# Patient Record
Sex: FEMALE | Race: OTHER
[De-identification: names, ages, dates, MRNs, and addresses within clinical notes are randomized per-mention and may not be internally consistent; named-entity substitution may affect disease eponyms.]

---

## 2019-10-18 ENCOUNTER — HOSPITAL ENCOUNTER (EMERGENCY)
Dept: HOSPITAL 25 - ED | Age: 19
LOS: 1 days | Discharge: HOME | End: 2019-10-19
Payer: COMMERCIAL

## 2019-10-18 DIAGNOSIS — R07.89: ICD-10-CM

## 2019-10-18 DIAGNOSIS — Z32.02: ICD-10-CM

## 2019-10-18 DIAGNOSIS — J40: Primary | ICD-10-CM

## 2019-10-18 DIAGNOSIS — M79.89: ICD-10-CM

## 2019-10-18 DIAGNOSIS — R11.0: ICD-10-CM

## 2019-10-18 DIAGNOSIS — J02.9: ICD-10-CM

## 2019-10-18 LAB
ALBUMIN SERPL BCG-MCNC: 4.5 G/DL (ref 3.2–5.2)
ALBUMIN/GLOB SERPL: 1.3 {RATIO} (ref 1–3)
ALP SERPL-CCNC: 66 U/L (ref 34–104)
ALT SERPL W P-5'-P-CCNC: 8 U/L (ref 7–52)
ANION GAP SERPL CALC-SCNC: 9 MMOL/L (ref 2–11)
AST SERPL-CCNC: 17 U/L (ref 13–39)
BASOPHILS # BLD AUTO: 0.1 10^3/UL (ref 0–0.2)
BUN SERPL-MCNC: 11 MG/DL (ref 6–24)
BUN/CREAT SERPL: 13.4 (ref 8–20)
CALCIUM SERPL-MCNC: 10 MG/DL (ref 8.6–10.3)
CHLORIDE SERPL-SCNC: 104 MMOL/L (ref 101–111)
EOSINOPHIL # BLD AUTO: 0.1 10^3/UL (ref 0–0.6)
GLOBULIN SER CALC-MCNC: 3.5 G/DL (ref 2–4)
GLUCOSE SERPL-MCNC: 93 MG/DL (ref 70–100)
HCG SERPL QL: < 0.6 MIU/ML
HCO3 SERPL-SCNC: 25 MMOL/L (ref 22–32)
HCT VFR BLD AUTO: 42 % (ref 35–47)
HGB BLD-MCNC: 13.9 G/DL (ref 12–16)
LYMPHOCYTES # BLD AUTO: 2.4 10^3/UL (ref 1–4.8)
MCH RBC QN AUTO: 30 PG (ref 27–31)
MCHC RBC AUTO-ENTMCNC: 33 G/DL (ref 31–36)
MCV RBC AUTO: 89 FL (ref 80–97)
MONOCYTES # BLD AUTO: 0.9 10^3/UL (ref 0–0.8)
NEUTROPHILS # BLD AUTO: 11.9 10^3/UL (ref 1.5–7.7)
NRBC # BLD AUTO: 0 10^3/UL
NRBC BLD QL AUTO: 0.1
PLATELET # BLD AUTO: 351 10^3/UL (ref 150–450)
POTASSIUM SERPL-SCNC: 4 MMOL/L (ref 3.5–5)
PROT SERPL-MCNC: 8 G/DL (ref 6.4–8.9)
RBC # BLD AUTO: 4.66 10^6 /UL (ref 3.7–4.87)
SODIUM SERPL-SCNC: 138 MMOL/L (ref 135–145)
WBC # BLD AUTO: 15.4 10^3/UL (ref 3.5–10.8)

## 2019-10-18 PROCEDURE — 96374 THER/PROPH/DIAG INJ IV PUSH: CPT

## 2019-10-18 PROCEDURE — 80053 COMPREHEN METABOLIC PANEL: CPT

## 2019-10-18 PROCEDURE — 96361 HYDRATE IV INFUSION ADD-ON: CPT

## 2019-10-18 PROCEDURE — 84702 CHORIONIC GONADOTROPIN TEST: CPT

## 2019-10-18 PROCEDURE — 99283 EMERGENCY DEPT VISIT LOW MDM: CPT

## 2019-10-18 PROCEDURE — 71046 X-RAY EXAM CHEST 2 VIEWS: CPT

## 2019-10-18 PROCEDURE — 93005 ELECTROCARDIOGRAM TRACING: CPT

## 2019-10-18 PROCEDURE — 36415 COLL VENOUS BLD VENIPUNCTURE: CPT

## 2019-10-18 PROCEDURE — 83690 ASSAY OF LIPASE: CPT

## 2019-10-18 PROCEDURE — 85379 FIBRIN DEGRADATION QUANT: CPT

## 2019-10-18 PROCEDURE — 85025 COMPLETE CBC W/AUTO DIFF WBC: CPT

## 2019-10-18 NOTE — ED
Abdominal Pain/Female





- HPI Summary


HPI Summary: 





This pt is a 20 Y/O F presenting to [facility] for a CC of abdominal pain located in 

her epigastric region that radiates into her stomach and rated a 4/10 in 

severity. She states that she has had flu like symptoms a couple weeks ago and 

that her symptoms had worsened today. She states that she has a sore throat, 

chills, productive cough with yellow thick phlegm, SOB that started today, and 

nausea that also started today. She also states that she has been fatigued and 

has had swelling in her R lower extremity behind her knee. She states that her 

pain was really bad today and was the worst that it has been. She states that 

the pain is worse on the L side of her epigastric region. She has no 

aggravating or alleviating factors. She states no PMHx that is pertinent. 





- History of Current Complaint


Chief Complaint: EDAbdPain


Stated Complaint: CHEST PAIN PER PT


Time Seen by Provider: 10/18/19 21:59


Hx Obtained From: Patient


Onset/Duration: Sudden Onset, Lasting Weeks, Still Present, Worse Since - this 

morning


Timing: Constant


Severity Initially: Mild


Severity Currently: Moderate


Pain Intensity: 5


Pain Scale Used: 0-10 Numeric


Location: Epigastric - states that it is worse on the L side


Radiates: Yes


Radiates to: Other - stomach


Aggravating Factor(s): Nothing


Alleviating Factor(s): Nothing


Associated Signs and Symptoms: Positive: Cough - production of yellow thick 

mucus, Nausea, Other: - POSITIVE: R lower extemity swelling, sore throat, chills

, productive cough with yellow thick phlegm, SOB that started today, and nausea 

that also started today.


Allergies/Adverse Reactions: 


 Allergies











Allergy/AdvReac Type Severity Reaction Status Date / Time


 


No Known Allergies Allergy   Verified 10/18/19 18:31














PMH/Surg Hx/FS Hx/Imm Hx


Previously Healthy: Yes


Infectious Disease History: No


Infectious Disease History: 


   Denies: Traveled Outside the [place] in Last 30 Days





- Social History


Occupation: Student - [employer] 


Lives: Dormitory/Roommates





Review of Systems


Positive: Chills, Fatigue.  Negative: Fever


Positive: Sore Throat


Positive: Shortness Of Breath, Cough - productive with yellow, thick, phlegm 


Positive: Abdominal Pain - epigastric, radiates to the stomach , Nausea


Musculoskeletal: Other - POSITIVE: swelling her R lower extremity 


All Other Systems Reviewed And Are Negative: Yes





Physical Exam





- Summary


Physical Exam Summary: 





Appearance: Well-appearing, Well-nourished, lying in bed comfortably


Skin: Warm, dry, no obvious rash


Eyes: sclera anicteric, no conjunctival pallor


ENT: mucous membranes moist, pharynx appears normal


Neck: Supple, nontender


Respiratory: Clear to auscultation, no signs of respiratory distress


Cardiovascular: Normal S1, S2. No murmurs. Normal distal pulses in tibial and 

radial bilaterally.


Abdomen: Soft, nontender, normal active bowel sounds present


Musculoskeletal: Normal, Strength/ROM Intact


Neurological: A&Ox3, awake and alert, mentation is normal, speech is fluent and 

appropriate


Psychiatric: affect is normal, does not appear anxious or depressed





Triage Information Reviewed: Yes


Vital Signs On Initial Exam: 


 Initial Vitals











Temp Pulse Resp BP Pulse Ox


 


 99.6 F   89   18   161/110   98 


 


 10/18/19 18:27  10/18/19 18:27  10/18/19 18:27  10/18/19 18:27  10/18/19 18:27











Vital Signs Reviewed: Yes





Procedures





- Sedation


Patient Received Moderate/Deep Sedation with Procedure: No





Diagnostics





- Vital Signs


 Vital Signs











  Temp Pulse Resp BP Pulse Ox


 


 10/18/19 20:30  99.5 F  92  18  136/76  97


 


 10/18/19 18:27  99.6 F  89  18  161/110  98














- Laboratory


Lab Results: 


 Lab Results











  10/18/19 10/18/19 Range/Units





  19:34 19:34 


 


WBC  15.4 H   (3.5-10.8)  10^3/uL


 


RBC  4.66   (3.70-4.87)  10^6 /uL


 


Hgb  13.9   (12.0-16.0)  g/dL


 


Hct  42   (35-47)  %


 


MCV  89   (80-97)  fL


 


MCH  30   (27-31)  pg


 


MCHC  33   (31-36)  g/dL


 


RDW  14   (10-15)  %


 


Plt Count  351   (150-450)  10^3/uL


 


MPV  8.3   (7.4-10.4)  fL


 


Neut % (Auto)  77.3   %


 


Lymph % (Auto)  15.4   %


 


Mono % (Auto)  6.1   %


 


Eos % (Auto)  0.4   %


 


Baso % (Auto)  0.8   %


 


Absolute Neuts (auto)  11.9 H   (1.5-7.7)  10^3/ul


 


Absolute Lymphs (auto)  2.4   (1.0-4.8)  10^3/ul


 


Absolute Monos (auto)  0.9 H   (0-0.8)  10^3/ul


 


Absolute Eos (auto)  0.1   (0-0.6)  10^3/ul


 


Absolute Basos (auto)  0.1   (0-0.2)  10^3/ul


 


Absolute Nucleated RBC  0.0   10^3/ul


 


Nucleated RBC %  0.1   


 


Sodium   138  (135-145)  mmol/L


 


Potassium   4.0  (3.5-5.0)  mmol/L


 


Chloride   104  (101-111)  mmol/L


 


Carbon Dioxide   25  (22-32)  mmol/L


 


Anion Gap   9  (2-11)  mmol/L


 


BUN   11  (6-24)  mg/dL


 


Creatinine   0.82  (0.51-0.95)  mg/dL


 


Est GFR ( Amer)   106.5  (>60)  


 


Est GFR (Non-Af Amer)   88.0  (>60)  


 


BUN/Creatinine Ratio   13.4  (8-20)  


 


Glucose   93  ()  mg/dL


 


Calcium   10.0  (8.6-10.3)  mg/dL


 


Total Bilirubin   0.40  (0.2-1.0)  mg/dL


 


AST   17  (13-39)  U/L


 


ALT   8  (7-52)  U/L


 


Alkaline Phosphatase   66  ()  U/L


 


Total Protein   8.0  (6.4-8.9)  g/dL


 


Albumin   4.5  (3.2-5.2)  g/dL


 


Globulin   3.5  (2-4)  g/dL


 


Albumin/Globulin Ratio   1.3  (1-3)  


 


Lipase   25  (11.0-82.0)  U/L


 


Beta HCG, Quant   < 0.60  mIU/mL











Result Diagrams: 


 10/18/19 19:34





 10/18/19 19:34


Lab Statement: Any lab studies that have been ordered have been reviewed, and 

results considered in the medical decision making process.





- Radiology


  ** CXR


Radiology Interpretation Completed By: ED Physician


Summary of Radiographic Findings: No acute processes. Pending offical review.





- Ultrasound


  ** Venous Doppler Study


Ultrasound Interpretation Completed By: Radiologist


Summary of Ultrasound Findings: No evidence for a DVT. ED physician has 

reviewed this report.





- EKG


  ** 1819


Cardiac Rate: NL - 88 BPM


EKG Rhythm: Sinus Rhythm


ST Segment: Normal


Ectopy: None


Summary of EKG Findings: NSR at 88 BPM, P waves, QRS complex, and T waves are 

within normal limits, T waves and intervals are normal, no ischemic changes. 

This is a normal EKG. Interpreted by Dr. Scott at 10/18/19 1825.





Abdominal Pain Fem Course/Dx





- Course


Course Of Treatment: This pt is a 20 Y/O F presenting to [facility] for a CC of 

abdominal pain located in her epigastric region that radiates into her stomach 

and rated a 4/10 in severity. She states that she has had flu like symptoms a 

couple weeks ago and that her symptoms had worsened today. She states that she 

has a sore throat, chills, productive cough with yellow thick phlegm, SOB that 

started today, and nausea that also started today. She states that her pain was 

really bad today and was the worst that it has been.  Her PE found no abnormal 

findings.  She has abnormal laboratory values in her WBc, Absolute neuts, 

Absolute Monos.  Her EKG taken at 1819 found that she had a NSR at 88 BPM, P 

waves, QRS complex, and T waves are within normal limits, T waves and intervals 

are normal, no ischemic changes. This is a normal EKG.  Her CXR shows no acute 

processes.  Her Venous Doppler Study found no evidence for a DVT.  She will be 

discharged home with a Dx of bronchitis and chest wall pain.





- Diagnoses


Provider Diagnoses: 


 Bronchitis, Chest wall pain








Discharge ED





- Sign-Out/Discharge


Documenting (check all that apply): Patient Departure - discharge 





- Discharge Plan


Condition: Good


Disposition: HOME


Prescriptions: 


Azithromycin TAB* [Zithromax TAB (Z-TRAV) 250 mg #6 tabs] 2 tab PO .TODAY, THEN 

1 DAILY #1 trav


Patient Education Materials:  Acute Bronchitis (ED)


Referrals: 


[facility] [Primary Care Provider] - 


Additional Instructions: 


You can take alleve (naproxen) 1 tab twice daily for pain.  the 

antibiotic in the morning from [facility] and start that.





- Billing Disposition and Condition


Condition: GOOD


Disposition: Home





- Attestation Statements


Document Initiated by Scribe: Yes


Documenting Scribe: Archie Pearl


Provider For Whom Scribe is Documenting (Include Credential): Srikanth Soto MD 


Scribe Attestation: 


I, Archie Pearl, scribed for Srikanth Soto MD  on 10/19/19 at 2113. 


Scribe Documentation Reviewed: Yes


Provider Attestation: 


The documentation as recorded by the scribeArchie accurately reflects 

the service I personally performed and the decisions made by me, Srikanth Soto MD 


Status of Scribe Document: Viewed

## 2019-10-19 VITALS — SYSTOLIC BLOOD PRESSURE: 142 MMHG | DIASTOLIC BLOOD PRESSURE: 94 MMHG

## 2021-11-26 ENCOUNTER — EMERGENCY (EMERGENCY)
Facility: HOSPITAL | Age: 21
LOS: 1 days | Discharge: HOME | End: 2021-11-26
Attending: EMERGENCY MEDICINE | Admitting: EMERGENCY MEDICINE
Payer: COMMERCIAL

## 2021-11-26 VITALS
RESPIRATION RATE: 18 BRPM | WEIGHT: 124.78 LBS | OXYGEN SATURATION: 94 % | SYSTOLIC BLOOD PRESSURE: 109 MMHG | TEMPERATURE: 99 F | DIASTOLIC BLOOD PRESSURE: 62 MMHG | HEIGHT: 64 IN | HEART RATE: 106 BPM

## 2021-11-26 VITALS
DIASTOLIC BLOOD PRESSURE: 65 MMHG | SYSTOLIC BLOOD PRESSURE: 100 MMHG | RESPIRATION RATE: 18 BRPM | OXYGEN SATURATION: 99 % | TEMPERATURE: 99 F | HEART RATE: 78 BPM

## 2021-11-26 DIAGNOSIS — R50.9 FEVER, UNSPECIFIED: ICD-10-CM

## 2021-11-26 DIAGNOSIS — Z20.822 CONTACT WITH AND (SUSPECTED) EXPOSURE TO COVID-19: ICD-10-CM

## 2021-11-26 LAB
ANION GAP SERPL CALC-SCNC: 10 MMOL/L — SIGNIFICANT CHANGE UP (ref 5–17)
APPEARANCE UR: CLEAR — SIGNIFICANT CHANGE UP
BACTERIA # UR AUTO: PRESENT /HPF
BASOPHILS # BLD AUTO: 0.1 K/UL — SIGNIFICANT CHANGE UP (ref 0–0.2)
BASOPHILS NFR BLD AUTO: 1.7 % — SIGNIFICANT CHANGE UP (ref 0–2)
BILIRUB UR-MCNC: NEGATIVE — SIGNIFICANT CHANGE UP
BUN SERPL-MCNC: 14 MG/DL — SIGNIFICANT CHANGE UP (ref 7–23)
CALCIUM SERPL-MCNC: 9.4 MG/DL — SIGNIFICANT CHANGE UP (ref 8.4–10.5)
CHLORIDE SERPL-SCNC: 104 MMOL/L — SIGNIFICANT CHANGE UP (ref 96–108)
CO2 SERPL-SCNC: 22 MMOL/L — SIGNIFICANT CHANGE UP (ref 22–31)
COLOR SPEC: YELLOW — SIGNIFICANT CHANGE UP
CREAT SERPL-MCNC: 0.9 MG/DL — SIGNIFICANT CHANGE UP (ref 0.5–1.3)
DIFF PNL FLD: NEGATIVE — SIGNIFICANT CHANGE UP
EOSINOPHIL # BLD AUTO: 0 K/UL — SIGNIFICANT CHANGE UP (ref 0–0.5)
EOSINOPHIL NFR BLD AUTO: 0 % — SIGNIFICANT CHANGE UP (ref 0–6)
EPI CELLS # UR: ABNORMAL /HPF (ref 0–5)
GIANT PLATELETS BLD QL SMEAR: PRESENT — SIGNIFICANT CHANGE UP
GLUCOSE SERPL-MCNC: 106 MG/DL — HIGH (ref 70–99)
GLUCOSE UR QL: NEGATIVE — SIGNIFICANT CHANGE UP
HCT VFR BLD CALC: 35.4 % — SIGNIFICANT CHANGE UP (ref 34.5–45)
HGB BLD-MCNC: 12.1 G/DL — SIGNIFICANT CHANGE UP (ref 11.5–15.5)
KETONES UR-MCNC: 15 MG/DL
LACTATE SERPL-SCNC: 0.9 MMOL/L — SIGNIFICANT CHANGE UP (ref 0.5–2)
LEUKOCYTE ESTERASE UR-ACNC: NEGATIVE — SIGNIFICANT CHANGE UP
LYMPHOCYTES # BLD AUTO: 0.2 K/UL — LOW (ref 1–3.3)
LYMPHOCYTES # BLD AUTO: 3.5 % — LOW (ref 13–44)
MANUAL SMEAR VERIFICATION: SIGNIFICANT CHANGE UP
MCHC RBC-ENTMCNC: 31.6 PG — SIGNIFICANT CHANGE UP (ref 27–34)
MCHC RBC-ENTMCNC: 34.2 GM/DL — SIGNIFICANT CHANGE UP (ref 32–36)
MCV RBC AUTO: 92.4 FL — SIGNIFICANT CHANGE UP (ref 80–100)
MONOCYTES # BLD AUTO: 0.35 K/UL — SIGNIFICANT CHANGE UP (ref 0–0.9)
MONOCYTES NFR BLD AUTO: 6.1 % — SIGNIFICANT CHANGE UP (ref 2–14)
NEUTROPHILS # BLD AUTO: 5.05 K/UL — SIGNIFICANT CHANGE UP (ref 1.8–7.4)
NEUTROPHILS NFR BLD AUTO: 87.8 % — HIGH (ref 43–77)
NEUTS BAND # BLD: 0.9 % — SIGNIFICANT CHANGE UP (ref 0–8)
NITRITE UR-MCNC: NEGATIVE — SIGNIFICANT CHANGE UP
OVALOCYTES BLD QL SMEAR: SLIGHT — SIGNIFICANT CHANGE UP
PH UR: 6.5 — SIGNIFICANT CHANGE UP (ref 5–8)
PLAT MORPH BLD: ABNORMAL
PLATELET # BLD AUTO: 295 K/UL — SIGNIFICANT CHANGE UP (ref 150–400)
POIKILOCYTOSIS BLD QL AUTO: SLIGHT — SIGNIFICANT CHANGE UP
POTASSIUM SERPL-MCNC: 3.9 MMOL/L — SIGNIFICANT CHANGE UP (ref 3.5–5.3)
POTASSIUM SERPL-SCNC: 3.9 MMOL/L — SIGNIFICANT CHANGE UP (ref 3.5–5.3)
PROT UR-MCNC: 30 MG/DL
RAPID RVP RESULT: SIGNIFICANT CHANGE UP
RBC # BLD: 3.83 M/UL — SIGNIFICANT CHANGE UP (ref 3.8–5.2)
RBC # FLD: 11.9 % — SIGNIFICANT CHANGE UP (ref 10.3–14.5)
RBC BLD AUTO: ABNORMAL
RBC CASTS # UR COMP ASSIST: < 5 /HPF — SIGNIFICANT CHANGE UP
SARS-COV-2 RNA SPEC QL NAA+PROBE: SIGNIFICANT CHANGE UP
SODIUM SERPL-SCNC: 136 MMOL/L — SIGNIFICANT CHANGE UP (ref 135–145)
SP GR SPEC: 1.02 — SIGNIFICANT CHANGE UP (ref 1–1.03)
UROBILINOGEN FLD QL: 0.2 E.U./DL — SIGNIFICANT CHANGE UP
WBC # BLD: 5.69 K/UL — SIGNIFICANT CHANGE UP (ref 3.8–10.5)
WBC # FLD AUTO: 5.69 K/UL — SIGNIFICANT CHANGE UP (ref 3.8–10.5)
WBC UR QL: < 5 /HPF — SIGNIFICANT CHANGE UP

## 2021-11-26 PROCEDURE — 81001 URINALYSIS AUTO W/SCOPE: CPT

## 2021-11-26 PROCEDURE — 36415 COLL VENOUS BLD VENIPUNCTURE: CPT

## 2021-11-26 PROCEDURE — 87040 BLOOD CULTURE FOR BACTERIA: CPT

## 2021-11-26 PROCEDURE — 99284 EMERGENCY DEPT VISIT MOD MDM: CPT

## 2021-11-26 PROCEDURE — 99283 EMERGENCY DEPT VISIT LOW MDM: CPT

## 2021-11-26 PROCEDURE — 87184 SC STD DISK METHOD PER PLATE: CPT

## 2021-11-26 PROCEDURE — 80048 BASIC METABOLIC PNL TOTAL CA: CPT

## 2021-11-26 PROCEDURE — 83605 ASSAY OF LACTIC ACID: CPT

## 2021-11-26 PROCEDURE — 85025 COMPLETE CBC W/AUTO DIFF WBC: CPT

## 2021-11-26 PROCEDURE — 0225U NFCT DS DNA&RNA 21 SARSCOV2: CPT

## 2021-11-26 PROCEDURE — 87086 URINE CULTURE/COLONY COUNT: CPT

## 2021-11-26 RX ORDER — IBUPROFEN 200 MG
600 TABLET ORAL ONCE
Refills: 0 | Status: COMPLETED | OUTPATIENT
Start: 2021-11-26 | End: 2021-11-26

## 2021-11-26 RX ORDER — SODIUM CHLORIDE 9 MG/ML
1000 INJECTION INTRAMUSCULAR; INTRAVENOUS; SUBCUTANEOUS ONCE
Refills: 0 | Status: COMPLETED | OUTPATIENT
Start: 2021-11-26 | End: 2021-11-26

## 2021-11-26 RX ADMIN — Medication 600 MILLIGRAM(S): at 09:51

## 2021-11-26 RX ADMIN — SODIUM CHLORIDE 1000 MILLILITER(S): 9 INJECTION INTRAMUSCULAR; INTRAVENOUS; SUBCUTANEOUS at 09:51

## 2021-11-26 RX ADMIN — SODIUM CHLORIDE 1000 MILLILITER(S): 9 INJECTION INTRAMUSCULAR; INTRAVENOUS; SUBCUTANEOUS at 11:27

## 2021-11-26 RX ADMIN — Medication 600 MILLIGRAM(S): at 11:26

## 2021-11-26 NOTE — ED PROVIDER NOTE - CLINICAL SUMMARY MEDICAL DECISION MAKING FREE TEXT BOX
fever, chills and bodyaches < 24 hrs. pt well appearing. low grade temp in ED. motrin and fluid given. labs noted. no elevated wbc, no infection on ua. will tx with motrin, tylenol, increase fluids and f/u with pmd. return precuations d/w pt. rvp results pending. self isolation until rvp results.

## 2021-11-26 NOTE — ED PROVIDER NOTE - NSFOLLOWUPINSTRUCTIONS_ED_ALL_ED_FT
Follow up with your physician within one week.       Viral Syndrome    WHAT YOU NEED TO KNOW:    Viral syndrome is a term used for symptoms of an infection caused by a virus. Viruses are spread easily from person to person through the air and on shared items.    DISCHARGE INSTRUCTIONS:    Call your local emergency number (911 in the [place]) or have someone else call if:   •You have a seizure.      •You cannot be woken.      •You have chest pain or trouble breathing.      Return to the emergency department if:   •You have a stiff neck, a bad headache, and sensitivity to light.      •You feel weak, dizzy, or confused.      •You stop urinating or urinate a lot less than usual.      •You cough up blood or thick yellow or green mucus.      •You have severe abdominal pain or your abdomen is larger than usual.      Call your doctor if:   •Your symptoms do not get better with treatment or get worse after 3 days.      •You have a rash or ear pain.      •You have burning when you urinate.      •You have questions or concerns about your condition or care.      Medicines: You may need any of the following:   •Acetaminophen decreases pain and fever. It is available without a doctor's order. Ask how much to take and how often to take it. Follow directions. Read the labels of all other medicines you are using to see if they also contain acetaminophen, or ask your doctor or pharmacist. Acetaminophen can cause liver damage if not taken correctly. Do not use more than 4 grams (4,000 milligrams) total of acetaminophen in one day.       •NSAIDs, such as ibuprofen, help decrease swelling, pain, and fever. NSAIDs can cause stomach bleeding or kidney problems in certain people. If you take blood thinner medicine, always ask your healthcare provider if NSAIDs are safe for you. Always read the medicine label and follow directions.      •Cold medicine helps decrease swelling, control a cough, and relieve chest or nasal congestion.       •Saline nasal spray helps decrease nasal congestion.       •Take your medicine as directed. Contact your healthcare provider if you think your medicine is not helping or if you have side effects. Tell him of her if you are allergic to any medicine. Keep a list of the medicines, vitamins, and herbs you take. Include the amounts, and when and why you take them. Bring the list or the pill bottles to follow-up visits. Carry your medicine list with you in case of an emergency.      Manage your symptoms:   •Drink liquids as directed to prevent dehydration. Ask how much liquid to drink each day and which liquids are best for you. Ask if you should drink an oral rehydration solution (ORS). An ORS has the right amounts of water, salts, and sugar you need to replace body fluids. This may help prevent dehydration caused by vomiting or diarrhea. Do not drink liquids with caffeine. Liquids with caffeine can make dehydration worse.      •Get plenty of rest to help your body heal. Take naps throughout the day. Ask your healthcare provider when you can return to work and your normal activities.      •Use a cool mist humidifier to help you breathe easier. Ask your healthcare provider how to use a cool mist humidifier.      •Eat honey or use cough drops for a sore throat. Cough drops are available without a doctor's order. Follow directions for taking cough drops.      •Do not smoke or be close to anyone who is smoking. Nicotine and other chemicals in cigarettes and cigars can cause lung damage. Smoking can also delay healing. Ask your healthcare provider for information if you currently smoke and need help to quit. E-cigarettes or smokeless tobacco still contain nicotine. Talk to your healthcare provider before you use these products.      Prevent the spread of germs:          •Wash your hands often. Wash your hands several times each day. Wash after you use the bathroom, change a child's diaper, and before you prepare or eat food. Use soap and water every time. Rub your soapy hands together, lacing your fingers. Wash the front and back of your hands, and in between your fingers. Use the fingers of one hand to scrub under the fingernails of the other hand. Wash for at least 20 seconds. Rinse with warm, running water for several seconds. Then dry your hands with a clean towel or paper towel. Use hand  that contains alcohol if soap and water are not available. Do not touch your eyes, nose, or mouth without washing your hands first.  Handwashing           •Cover a sneeze or cough. Use a tissue that covers your mouth and nose. Throw the tissue away in a trash can right away. Use the bend of your arm if a tissue is not available. Wash your hands well with soap and water or use a hand .      •Stay away from others while you are sick. Avoid crowds as much as possible.      •Ask about vaccines you may need. Talk to your healthcare provider about your vaccine history. He or she will tell you which vaccines you need, and when to get them.?Get the influenza (flu) vaccine as soon as recommended each year. The flu vaccine is available starting in September or October. Flu viruses change, so it is important to get a flu vaccine every year.      ?Get the pneumonia vaccine if recommended. This vaccine is usually recommended every 5 years. Your provider will tell you when to get this vaccine, if needed.        Follow up with your doctor as directed: Write down your questions so you remember to ask them during your visits.       © Copyright [employer] 2021           back to top                          © Copyright [employer] 2021

## 2021-11-26 NOTE — ED ADULT TRIAGE NOTE - CHIEF COMPLAINT QUOTE
Pt complains of persistent fever since last night. Took two Tylenol extra strength last night and this morning. Fever associated with body aches and muscle pain.

## 2021-11-26 NOTE — ED ADULT NURSE NOTE - OBJECTIVE STATEMENT
21 y.o F a&ox4 walked in from triage c.o fever. fever and body aches since yesterday. reports 102F this morning took Tylenol at 7:30 am.  went to urgent care tested neg for strep and flu. sent in for elevated fever. denies SOB, CP, diarrhea, sore throat, cough, loss of taste/ smell.

## 2021-11-26 NOTE — ED PROVIDER NOTE - PATIENT PORTAL LINK FT
You can access the FollowMyHealth Patient Portal offered by [facility] by registering at the following website: [email or web address]. By joining "[employer]’s FollowMyHealth portal, you will also be able to view your health information using other applications (apps) compatible with our system.

## 2021-11-26 NOTE — ED PROVIDER NOTE - ATTENDING CONTRIBUTION TO CARE
20 yo female no pmh sent from urgent care for fever, myalgias.  T max 101.  No uri sx, cough, cp, sob, abd pain, n/v/d, dysuria, freq, urgency, flank pain, skin lesions/rash, sick contacts. + travel to [place].  S/p covid vaccination.  Rapid flu and covid neg at urgent care.  Well appearing, nad, nc/at, lung cta, heart reg, abd soft, nt, ext no gross deformity, no gross neuro deficits.  Patient likely has a viral infection; no sx to suggest meningitis, pna, uti, intra-abd infection.  The patient is non toxic appearing with no focal lung findings and acceptable oxygenation.  No increased wob or resp distress.  No further eval or treatment indicated at this time.  Supportive care including increased fluids and over the counter therapy was advised and discussed.  Plan labs, reassess. Pt counseled to quarantine x 10 d unless COVID neg.  Typical viral illness and COVID course discussed; pt counseled to return for worsening ha, neck stiffness, cp, sob, fever, any other concerns.  Hand hygiene and isolation reviewed.

## 2021-11-28 LAB
-  CLINDAMYCIN: SIGNIFICANT CHANGE UP
-  ERYTHROMYCIN: SIGNIFICANT CHANGE UP
-  LEVOFLOXACIN: SIGNIFICANT CHANGE UP
-  PENICILLIN: SIGNIFICANT CHANGE UP
-  VANCOMYCIN: SIGNIFICANT CHANGE UP
CULTURE RESULTS: SIGNIFICANT CHANGE UP
METHOD TYPE: SIGNIFICANT CHANGE UP
ORGANISM # SPEC MICROSCOPIC CNT: SIGNIFICANT CHANGE UP
ORGANISM # SPEC MICROSCOPIC CNT: SIGNIFICANT CHANGE UP
SPECIMEN SOURCE: SIGNIFICANT CHANGE UP

## 2021-12-01 LAB
CULTURE RESULTS: SIGNIFICANT CHANGE UP
CULTURE RESULTS: SIGNIFICANT CHANGE UP
SPECIMEN SOURCE: SIGNIFICANT CHANGE UP
SPECIMEN SOURCE: SIGNIFICANT CHANGE UP

## 2021-12-11 ENCOUNTER — EMERGENCY (EMERGENCY)
Facility: HOSPITAL | Age: 21
LOS: 1 days | Discharge: HOME | End: 2021-12-11
Attending: EMERGENCY MEDICINE | Admitting: EMERGENCY MEDICINE
Payer: COMMERCIAL

## 2021-12-11 VITALS
TEMPERATURE: 98 F | WEIGHT: 126.1 LBS | HEIGHT: 64 IN | RESPIRATION RATE: 18 BRPM | DIASTOLIC BLOOD PRESSURE: 84 MMHG | OXYGEN SATURATION: 98 % | SYSTOLIC BLOOD PRESSURE: 133 MMHG | HEART RATE: 95 BPM

## 2021-12-11 DIAGNOSIS — U07.1 COVID-19: ICD-10-CM

## 2021-12-11 DIAGNOSIS — J35.1 HYPERTROPHY OF TONSILS: ICD-10-CM

## 2021-12-11 DIAGNOSIS — R06.4 HYPERVENTILATION: ICD-10-CM

## 2021-12-11 DIAGNOSIS — J06.9 ACUTE UPPER RESPIRATORY INFECTION, UNSPECIFIED: ICD-10-CM

## 2021-12-11 DIAGNOSIS — L53.9 ERYTHEMATOUS CONDITION, UNSPECIFIED: ICD-10-CM

## 2021-12-11 DIAGNOSIS — J02.9 ACUTE PHARYNGITIS, UNSPECIFIED: ICD-10-CM

## 2021-12-11 DIAGNOSIS — F41.9 ANXIETY DISORDER, UNSPECIFIED: ICD-10-CM

## 2021-12-11 DIAGNOSIS — R55 SYNCOPE AND COLLAPSE: ICD-10-CM

## 2021-12-11 LAB
FLUAV AG NPH QL: SIGNIFICANT CHANGE UP
FLUBV AG NPH QL: SIGNIFICANT CHANGE UP
RSV RNA NPH QL NAA+NON-PROBE: SIGNIFICANT CHANGE UP
S PYO AG SPEC QL IA: NEGATIVE — SIGNIFICANT CHANGE UP
SARS-COV-2 RNA SPEC QL NAA+PROBE: DETECTED

## 2021-12-11 PROCEDURE — 93010 ELECTROCARDIOGRAM REPORT: CPT

## 2021-12-11 PROCEDURE — 99284 EMERGENCY DEPT VISIT MOD MDM: CPT

## 2021-12-11 PROCEDURE — 87081 CULTURE SCREEN ONLY: CPT

## 2021-12-11 PROCEDURE — 87637 SARSCOV2&INF A&B&RSV AMP PRB: CPT

## 2021-12-11 PROCEDURE — 87880 STREP A ASSAY W/OPTIC: CPT

## 2021-12-11 PROCEDURE — 99283 EMERGENCY DEPT VISIT LOW MDM: CPT | Mod: 25

## 2021-12-11 PROCEDURE — 93005 ELECTROCARDIOGRAM TRACING: CPT

## 2021-12-11 RX ORDER — ACETAMINOPHEN 500 MG
975 TABLET ORAL ONCE
Refills: 0 | Status: COMPLETED | OUTPATIENT
Start: 2021-12-11 | End: 2021-12-11

## 2021-12-11 RX ADMIN — Medication 975 MILLIGRAM(S): at 21:32

## 2021-12-11 RX ADMIN — Medication 975 MILLIGRAM(S): at 22:29

## 2021-12-11 NOTE — ED PROVIDER NOTE - OBJECTIVE STATEMENT
20 yo female no pmh c/o congestion, sore throat, occ dry cough, myalgias, subjective fever, fatigue starting yest.  No sick contact, travel.  Pt c/o feeling palpitations, sob, room darkening as if she might pass out when she was at home; these sx are improved now.  Pt concerned bc she also had fever 11/26, seen in ed w neg jevon.  Pt took tylenol yest w relief of sx.  No other otc meds.

## 2021-12-11 NOTE — ED PROVIDER NOTE - CONDITION AT DISCHARGE:
----- Message from Elizabeth Dominguez sent at 8/30/2017 11:32 AM EDT -----  Contact: MD INR  MD- INR calling to report an out of range INR.    INR was 1.8    Please advise.        Satisfactory

## 2021-12-11 NOTE — ED PROVIDER NOTE - CLINICAL SUMMARY MEDICAL DECISION MAKING FREE TEXT BOX
Patient likely has a viral Upper respiratory infection; no evidence to suggest sepsis or meningitis;  The patient is non toxic appearing with no focal lung findings and acceptable oxygenation.  No increased wob or resp distress.  Pt w mild erythema, tonsillar enlargement - low concern for strep.  Pt also reports near syncope - hpi most suggestive of anxiety attack w hyperventilation; no sig concern for acs, arrhythmia, pe.  Plan labs, ekg.  Supportive care including increased fluids and over the counter therapy was advised and discussed.  Pt counseled to quarantine x 10 d unless COVID neg.  Typical COVID course discussed; pt counseled to return for worsening ha, neck stiffness, cp, sob, fever, any other concerns.  Hand hygiene and isolation reviewed.

## 2021-12-11 NOTE — ED PROVIDER NOTE - PATIENT PORTAL LINK FT
You can access the FollowMyHealth Patient Portal offered by [facility] by registering at the following website: [email or web address]. By joining [employer]’s FollowMyHealth portal, you will also be able to view your health information using other applications (apps) compatible with our system.

## 2021-12-11 NOTE — ED ADULT NURSE NOTE - OBJECTIVE STATEMENT
Patient anxious and tearful, c/o of sore throat, body aches, mild cough, states sometimes feels like passing out and hyperventilating, feeling sick, w/ mild colds.  States had fever last week, currently resolved , tested Covid negative then.  Was seen for same symptoms last month.

## 2021-12-11 NOTE — ED PROVIDER NOTE - CARE PLAN
Principal Discharge DX:	Upper respiratory infection  Secondary Diagnosis:	Near syncope  Secondary Diagnosis:	Anxiety   1

## 2021-12-11 NOTE — ED PROVIDER NOTE - NSFOLLOWUPINSTRUCTIONS_ED_ALL_ED_FT
URI  Near syncope  Anxiety attack    We are concerned you may have COVID or other viral infection.  Rest.  Drink plenty of fluids.  Try over the counter medications based on your symptoms for relief; use as directed: sudafed or similar and/or nasal sprays for congestion, robitussin or similar for cough, tylenol for fever, body aches, pain.     Return for increased pain, increased sob, change in cough/sputum, increased fever, intractable vomiting, any other concerns.     ** Self-isolate for 10 days from the onset of your symptoms (extend your isolation if you are still having cough or fever at day 10 until you are cough and fever free for 24 hours) UNLESS your COVID test is negative.  Cough/sneeze into your elbow area.  Wash your hands after touching your face and avoid touching your face if able; if you are sharing spaces, clean surfaces after you touch them.     Viral Respiratory Infection    A viral respiratory infection is an illness that affects parts of the body used for breathing, like the lungs, nose, and throat. It is caused by a germ called a virus. Symptoms can include runny nose, coughing, sneezing, fatigue, body aches, sore throat, fever, or headache. Over the counter medicine can be used to manage the symptoms but the infection typically goes away on its own in 5 to 10 days.     SEEK IMMEDIATE MEDICAL CARE IF YOU HAVE ANY OF THE FOLLOWING SYMPTOMS: shortness of breath, chest pain, fever over 10 days, or lightheadedness/dizziness.     Syncope    Syncope is when you temporarily lose consciousness, also called fainting or passing out. It is caused by a sudden decrease in blood flow to the brain. Even though most causes of syncope are not dangerous, syncope can possibly be a sign of a serious medical problem. Signs that you may be about to faint include feeling dizzy, lightheaded, nausea, visual changes, or cold/clammy skin. Do not drive, operate heavy machinery, or play sports until your health care provider says it is okay.    SEEK IMMEDIATE MEDICAL CARE IF YOU HAVE ANY OF THE FOLLOWING SYMPTOMS: severe headache, pain in your chest/abdomen/back, bleeding from your mouth or rectum, palpitations, shortness of breath, pain with breathing, seizure, confusion, or trouble walking.    Anxiety    Generalized anxiety disorder (TURNER) is a mental disorder. It is defined as anxiety that is not necessarily related to specific events or activities or is out of proportion to said events. Symptoms include restlessness, fatigue, difficulty concentrations, irritability and difficulty concentrating. It may interfere with life functions, including relationships, work, and school. If you were started on a medication, make sure to take exactly as prescribed and follow up with a psychiatrist.    SEEK IMMEDIATE MEDICAL CARE IF YOU HAVE ANY OF THE FOLLOWING SYMPTOMS: thoughts about hurting killing yourself, thoughts about hurting or killing somebody else, hallucinations, or worsening depression.

## 2021-12-11 NOTE — ED PROVIDER NOTE - ENMT, MLM
Airway patent, Nasal mucosa clear. Mouth with normal mucosa. Throat has + erythema, mild tonsillar swelling w/o exudate, no vesicles, no oropharyngeal exudates and uvula is midline.

## 2021-12-12 PROBLEM — Z78.9 OTHER SPECIFIED HEALTH STATUS: Chronic | Status: ACTIVE | Noted: 2021-11-26

## 2024-10-18 NOTE — ED ADULT TRIAGE NOTE - NS ED TRIAGE AVPU SCALE
Alert-The patient is alert, awake and responds to voice. The patient is oriented to time, place, and person. The triage nurse is able to obtain subjective information.
21-Oct-2024